# Patient Record
Sex: MALE | Race: WHITE | NOT HISPANIC OR LATINO | URBAN - METROPOLITAN AREA
[De-identification: names, ages, dates, MRNs, and addresses within clinical notes are randomized per-mention and may not be internally consistent; named-entity substitution may affect disease eponyms.]

---

## 2018-07-25 ENCOUNTER — OFFICE VISIT (OUTPATIENT)
Dept: URBAN - METROPOLITAN AREA CLINIC 88 | Facility: CLINIC | Age: 63
End: 2018-07-25
Payer: MEDICARE

## 2018-07-25 DIAGNOSIS — H25.13 AGE-RELATED NUCLEAR CATARACT, BILATERAL: ICD-10-CM

## 2018-07-25 DIAGNOSIS — H53.021 REFRACTIVE AMBLYOPIA, RIGHT EYE: ICD-10-CM

## 2018-07-25 DIAGNOSIS — D31.31 BENIGN NEOPLASM OF RIGHT CHOROID: ICD-10-CM

## 2018-07-25 DIAGNOSIS — H31.011 MACULA SCARS OF POSTERIOR POLE (POSTINFLAMMATORY) (POST-TRAUMATIC), RIGHT EYE: Primary | ICD-10-CM

## 2018-07-25 PROCEDURE — 99214 OFFICE O/P EST MOD 30 MIN: CPT | Performed by: OPHTHALMOLOGY

## 2018-07-25 ASSESSMENT — INTRAOCULAR PRESSURE
OS: 14
OD: 14

## 2018-07-25 NOTE — IMPRESSION/PLAN
Impression: Benign neoplasm of right choroid: D31.31. Plan: Discussed diagnosis in detail with patient. No treatment is required at this time. Will continue to observe condition.

## 2018-07-25 NOTE — IMPRESSION/PLAN
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 Plan: Macular scar OD.  Use of vitamins has shown to improve the effects of ARMD. Continue to observe

## 2019-01-24 ENCOUNTER — OFFICE VISIT (OUTPATIENT)
Dept: URBAN - METROPOLITAN AREA CLINIC 88 | Facility: CLINIC | Age: 64
End: 2019-01-24
Payer: MEDICARE

## 2019-01-24 DIAGNOSIS — H43.393 OTHER VITREOUS OPACITIES, BILATERAL: ICD-10-CM

## 2019-01-24 PROCEDURE — 99214 OFFICE O/P EST MOD 30 MIN: CPT | Performed by: OPHTHALMOLOGY

## 2019-01-24 ASSESSMENT — INTRAOCULAR PRESSURE
OS: 14
OD: 13

## 2019-01-24 NOTE — IMPRESSION/PLAN
Impression: Other vitreous opacities, bilateral: H43.393. OU. Condition: established, stable. Symptoms: will continue to monitor. Plan: Discussed signs and symptoms of PVD/floaters. Patient instructed to call the office immediately if any symptoms noted.

## 2019-01-24 NOTE — IMPRESSION/PLAN
Impression: Age-related nuclear cataract, bilateral: H25.13. OU. Condition: established, stable. Symptoms: will continue to monitor. Plan: Discussed diagnosis in detail with patient. No treatment is required at this time. The patient will monitor vision changes and contact us with any decrease in vision.

## 2019-01-24 NOTE — IMPRESSION/PLAN
Impression: Refractive amblyopia, right eye: H53.021. OD. Condition: established, stable. Plan: Long standing condition.

## 2019-01-24 NOTE — IMPRESSION/PLAN
Impression: Benign neoplasm of right choroid: D31.31. OD. Condition: established, stable. Symptoms: will continue to monitor. Plan: Discussed diagnosis in detail with patient. No treatment is required at this time. Patient was instructed to report any type of visual field changes or visual changes immediately.

## 2019-01-24 NOTE — IMPRESSION/PLAN
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 OD. Condition: established, stable. Symptoms: will continue to monitor. Plan: Advised patient of condition. Stable. Will continue to monitor. Call if [address] worsens.

## 2019-07-15 ENCOUNTER — OFFICE VISIT (OUTPATIENT)
Dept: URBAN - METROPOLITAN AREA CLINIC 88 | Facility: CLINIC | Age: 64
End: 2019-07-15
Payer: MEDICARE

## 2019-07-15 DIAGNOSIS — B18.2 CHRONIC VIRAL HEPATITIS C: ICD-10-CM

## 2019-07-15 PROCEDURE — 99214 OFFICE O/P EST MOD 30 MIN: CPT | Performed by: OPHTHALMOLOGY

## 2019-07-15 ASSESSMENT — VISUAL ACUITY
OD: 20/400
OS: 20/20

## 2019-07-15 ASSESSMENT — INTRAOCULAR PRESSURE
OS: 14
OS: 16
OD: 14
OD: 15

## 2019-07-15 NOTE — IMPRESSION/PLAN
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 OD. Condition: established, stable. Symptoms: will continue to monitor. Plan: Advised patient of condition. Stable. Will continue to monitor. Call if [address] worsens.

## 2020-01-16 ENCOUNTER — OFFICE VISIT (OUTPATIENT)
Dept: URBAN - METROPOLITAN AREA CLINIC 88 | Facility: CLINIC | Age: 65
End: 2020-01-16
Payer: MEDICARE

## 2020-01-16 PROCEDURE — 99214 OFFICE O/P EST MOD 30 MIN: CPT | Performed by: OPHTHALMOLOGY

## 2020-01-16 ASSESSMENT — INTRAOCULAR PRESSURE
OD: 13
OS: 13

## 2020-01-16 NOTE — IMPRESSION/PLAN
Impression: Benign neoplasm of right choroid: D31.31 OD. Plan: Advised patient of condition. No treatment is required at this time.

## 2020-01-16 NOTE — IMPRESSION/PLAN
Impression: Age-related nuclear cataract, bilateral: H25.13 OU. Condition: established, stable. Plan: Discussed diagnosis in detail with patient. No treatment is required at this time. The patient will monitor vision changes and contact us with any decrease in vision.

## 2020-01-16 NOTE — IMPRESSION/PLAN
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 OD. Condition: established, stable. Symptoms: will continue to monitor. Plan: Advised patient of condition. Stable. No change to current condition.  Will continue to observe